# Patient Record
Sex: FEMALE | Race: ASIAN | NOT HISPANIC OR LATINO | URBAN - METROPOLITAN AREA
[De-identification: names, ages, dates, MRNs, and addresses within clinical notes are randomized per-mention and may not be internally consistent; named-entity substitution may affect disease eponyms.]

---

## 2023-10-14 ENCOUNTER — EMERGENCY (EMERGENCY)
Facility: HOSPITAL | Age: 43
LOS: 1 days | Discharge: HOME | End: 2023-10-14
Attending: EMERGENCY MEDICINE
Payer: MEDICAID

## 2023-10-14 VITALS
DIASTOLIC BLOOD PRESSURE: 73 MMHG | HEART RATE: 104 BPM | OXYGEN SATURATION: 95 % | TEMPERATURE: 99 F | SYSTOLIC BLOOD PRESSURE: 119 MMHG | RESPIRATION RATE: 20 BRPM | HEIGHT: 62 IN | WEIGHT: 160.06 LBS

## 2023-10-14 VITALS — HEART RATE: 98 BPM

## 2023-10-14 PROCEDURE — 99283 EMERGENCY DEPT VISIT LOW MDM: CPT

## 2023-10-14 PROCEDURE — 99282 EMERGENCY DEPT VISIT SF MDM: CPT

## 2023-10-14 NOTE — ED ADULT NURSE NOTE - NSFALLUNIVINTERV_ED_ALL_ED
Bed/Stretcher in lowest position, wheels locked, appropriate side rails in place/Call bell, personal items and telephone in reach/Instruct patient to call for assistance before getting out of bed/chair/stretcher/Non-slip footwear applied when patient is off stretcher/[place] to call system/Physically safe environment - no spills, clutter or unnecessary equipment/Purposeful proactive rounding/Room/bathroom lighting operational, light cord in reach

## 2023-10-14 NOTE — ED PROVIDER NOTE - NSFOLLOWUPCLINICS_GEN_ALL_ED_FT
[facility] - Ophthalmology  Ophthalmology  [address] [facility] [address]  [place] [address]  Phone: [phone]  Fax:

## 2023-10-14 NOTE — ED PROVIDER NOTE - PATIENT PORTAL LINK FT
You can access the FollowMyHealth Patient Portal offered by [facility] by registering at the following website: [email or web address]. By joining [email or web address]’s FollowMyHealth portal, you will also be able to view your health information using other applications (apps) compatible with our system.

## 2023-10-14 NOTE — ED PROVIDER NOTE - OBJECTIVE STATEMENT
43-year-old female no significant past history does not wear contact lenses or glasses presents with brother for evaluation of right eye redness. Patient states that 3 weeks ago symptoms began, went to her doctor 2 weeks ago who started patient on Cipro drops, Benadryl orally and Tylenol pills.  Patient states that her eye has greatly improved however her doctor told her that if by this point her eye does not completely improve she is to go to the ER or an ophthalmologist.  Patient denies worsening pain.  Patient denies any fevers, chills denies any discharge from the eye or any injury to the eye.  Patient states that her vision is intact and normal.

## 2023-10-14 NOTE — ED PROVIDER NOTE - ATTENDING APP SHARED VISIT CONTRIBUTION OF CARE
I agree with the NP findings except as noted in my attestation note.    43 female with hx of no known medical problems.   Pt presenting to the ED reporting right eye redness.  Patient reports that she noticed some medial redness ~3 weeks ago and was given Cipro ophthalmic drops as well as diphenhydramine with near resolution of symptoms.  Patient reports that PMD told her to come to ED if symptoms are not completely resolved.  No trauma.  No foreign body sensation.  No contact lens use.  No vision changes.    Constitutional: (-) fever (-) chills  HENT: (-) congestion (-) rhinorrhea (-) sore throat  Eyes: (-) pain (+) redness (-) discharge  Respiratory: (-) cough   Skin: (-) rash  Neurological: (-) weakness (-) numbness (-) headaches  Psychiatric/Behavioral: (-) confusion    Gen:  Awake, alert, NAD, WDWN, NCAT, non-toxic appearing.  Eyes:  PERRL 3 mm, EOMI, no icterus, normal lids/lashes, left eye normal conjunctivae, right eye minimal conjunctival injection medially.  No foreign body on lid eversion.  ENT:  External inspection normal, pink/moist membranes.  CV:  Warm/well-perfused, no JVD.  Resp:  Normal respiratory rate/effort, no respiratory distress, normal voice, speaking full sentences, no retractions.  Abd:  Soft abdomen, no tender/distended/guarding/rebound, no pulsatile mass, no CVA tender.  Musculoskeletal:  N/V intact, FROM all 4 extremities, stable gait.  Neck:  FROM neck, trachea midline.  Skin:  Color normal for race, warm and dry, no rash to visible skin regions.  Neuro:  Oriented x3, CN 2-12 intact (grossly), normal motor (grossly), normal sensory (grossly), normal gait.  Psych:  Attention normal. Affect normal. Behavior normal. Judgment normal.    43 female with no known medical problems presenting to the ED with 3 weeks of R eye irritation.  No foreign body.  No trauma.  Already completed course of antibiotics.  Minimal erythema noted still.    Vitals stable.    Nontoxic appearing, n/v intact.      Pt advised regarding need for close outpatient follow up with ophthalmology.  Patient stable for further care in outpatient setting. No indication for inpatient admission at this time. Patient advised regarding symptomatic & supportive care and symptoms to prompt ED return. Strict return precautions provided.

## 2023-10-14 NOTE — ED ADULT NURSE NOTE - DRUG PRE-SCREENING (DAST -1)
Can send for one month supply   lov 7/14/17 fuv 1/15/18     Please advise refill request for:    pravastatin (PRAVACHOL) 40 MG tablet 30 tablet 2 6/13/2017     Sig - Route: Take 1 tablet by mouth daily         Statement Selected SCM

## 2023-10-14 NOTE — ED ADULT NURSE NOTE - OBJECTIVE STATEMENT
pt is here for eye redness.  As per family member, right eye redness/pain x 3 weeks, it doesn't improve with antibiotic, denied blurred vision or blurred vision,

## 2023-10-14 NOTE — ED PROVIDER NOTE - NSCAREINITIATED _GEN_ER
Assumed cares: [id number]    Events: Epistaxis x1. Lactic 2.0, code sepsis called due to hypotension. 1x 25g albumin given for hypotension.  PICC caps changed. Tachycardic and hypotensive, SpO2 WNL on 2L O2. Afebrile.     A&Ox4. Hypoactive affect. Gtube to LIS and J tube TF infusing at goal rate 45mL/hr. Pt NPO w/ occasional ice chips. Pt c/o pain in lower abdomen around old gtube site. Dressings changed. PRN oral dilaudid given x2 and IV dilaudid, tylenol and flexeril given x1. Pt refuses repositioning.     Plan: Continue to monitor VS q2 due to hypotension.      Abundio Gonzales(NP)

## 2023-10-14 NOTE — ED PROVIDER NOTE - CLINICAL SUMMARY MEDICAL DECISION MAKING FREE TEXT BOX
Patient well appearing, normal visual eye exam.  Pictures presented from patient originally show mild eye irritation, which has since improved.  Patient without new or worrisome / worsening complaints.  Will have patient f/u with opthalmology.
Support Present

## 2023-10-14 NOTE — ED ADULT NURSE NOTE - CAS TRG GENERAL [employer]WAY, MLM
Dear  Linda Angeles, DO,  Thank you very much for your referral or Ms. Sulema Cronin to me for evaluation and treatment of her Hand & Wrist condition. I appreciate your confidence in me and thank you for allowing me the opportunity to care for your patients. If I can be of any further assistance to you on this or any other patient, please do not hesitate to contact me. Sincerely,  Patricia Smith.  Deny Fam MD Patent Patient requests all Lab, Cardiology, and Radiology Results on their Discharge Instructions
